# Patient Record
Sex: MALE | Race: WHITE | NOT HISPANIC OR LATINO | Employment: FULL TIME | URBAN - METROPOLITAN AREA
[De-identification: names, ages, dates, MRNs, and addresses within clinical notes are randomized per-mention and may not be internally consistent; named-entity substitution may affect disease eponyms.]

---

## 2023-08-03 ENCOUNTER — OFFICE VISIT (OUTPATIENT)
Dept: URGENT CARE | Facility: CLINIC | Age: 53
End: 2023-08-03
Payer: COMMERCIAL

## 2023-08-03 VITALS
TEMPERATURE: 98.7 F | SYSTOLIC BLOOD PRESSURE: 120 MMHG | RESPIRATION RATE: 18 BRPM | OXYGEN SATURATION: 99 % | WEIGHT: 200 LBS | BODY MASS INDEX: 24.36 KG/M2 | HEART RATE: 56 BPM | DIASTOLIC BLOOD PRESSURE: 70 MMHG | HEIGHT: 76 IN

## 2023-08-03 DIAGNOSIS — S80.811A ABRASION, LEG W/ INFECTION, RIGHT, INITIAL ENCOUNTER: ICD-10-CM

## 2023-08-03 DIAGNOSIS — L08.9 ABRASION, LEG W/ INFECTION, RIGHT, INITIAL ENCOUNTER: ICD-10-CM

## 2023-08-03 PROCEDURE — 3074F SYST BP LT 130 MM HG: CPT | Performed by: NURSE PRACTITIONER

## 2023-08-03 PROCEDURE — 3078F DIAST BP <80 MM HG: CPT | Performed by: NURSE PRACTITIONER

## 2023-08-03 PROCEDURE — 90715 TDAP VACCINE 7 YRS/> IM: CPT | Performed by: NURSE PRACTITIONER

## 2023-08-03 PROCEDURE — 99203 OFFICE O/P NEW LOW 30 MIN: CPT | Mod: 25 | Performed by: NURSE PRACTITIONER

## 2023-08-03 PROCEDURE — 90471 IMMUNIZATION ADMIN: CPT | Performed by: NURSE PRACTITIONER

## 2023-08-03 RX ORDER — CEPHALEXIN 500 MG/1
500 CAPSULE ORAL 4 TIMES DAILY
Qty: 28 CAPSULE | Refills: 0 | Status: SHIPPED | OUTPATIENT
Start: 2023-08-03 | End: 2023-08-10

## 2023-08-03 RX ORDER — SULFAMETHOXAZOLE AND TRIMETHOPRIM 800; 160 MG/1; MG/1
1 TABLET ORAL 2 TIMES DAILY
Qty: 14 TABLET | Refills: 0 | Status: SHIPPED | OUTPATIENT
Start: 2023-08-03 | End: 2023-08-10

## 2023-08-03 ASSESSMENT — ENCOUNTER SYMPTOMS
CHILLS: 0
FEVER: 0

## 2023-08-04 NOTE — PATIENT INSTRUCTIONS
-NSAID's (ibuprofen) and tylenol as directed for pain and inflammation.   -RICE Therapy: Rest, Ice, Compression, Elevation  -Gently clean area with mild soap and water.      Start with the cephalexin first. If there are increasing signs of infection, take the Bactrim in addition to the cephalexin.     Follow up for persistent or increased signs of infection (redness, red streaking, pus, foul odor, severe pain, increased swelling or fever) or any other concerns. Follow up emergently for severe uncontrolled pain, neurovascular compromise (decreased sensation, motion, or circulation).

## 2023-08-04 NOTE — PROGRESS NOTES
"  Subjective:     Elia Deal is a 52 y.o. male who presents for Wound Infection (X 7 days, scrape redness, swelling, ankle swelling.)      Slipped and cut his right leg on wood, outdoors a week ago. Redness developed a couple of days ago. Swelling now distally in to the ankle. No pain currently. Has been using Neosporin.           Wound Infection  This is a new problem. The current episode started in the past 7 days. The problem has been gradually worsening. Pertinent negatives include no chills or fever.       History reviewed. No pertinent past medical history.    History reviewed. No pertinent surgical history.    Social History     Socioeconomic History    Marital status:      Spouse name: Not on file    Number of children: Not on file    Years of education: Not on file    Highest education level: Not on file   Occupational History    Not on file   Tobacco Use    Smoking status: Never    Smokeless tobacco: Never   Vaping Use    Vaping Use: Never used   Substance and Sexual Activity    Alcohol use: Yes     Comment: occ    Drug use: Never    Sexual activity: Not on file   Other Topics Concern    Not on file   Social History Narrative    Not on file     Social Determinants of Health     Financial Resource Strain: Not on file   Food Insecurity: Not on file   Transportation Needs: Not on file   Physical Activity: Not on file   Stress: Not on file   Social Connections: Not on file   Intimate Partner Violence: Not on file   Housing Stability: Not on file        History reviewed. No pertinent family history.     Allergies   Allergen Reactions    Azithromycin        Review of Systems   Constitutional:  Negative for chills and fever.   Musculoskeletal:  Negative for joint pain.   All other systems reviewed and are negative.       Objective:   /70   Pulse (!) 56   Temp 37.1 °C (98.7 °F) (Temporal)   Resp 18   Ht 1.93 m (6' 4\")   Wt 90.7 kg (200 lb)   SpO2 99%   BMI 24.34 kg/m²     Physical Exam  Vitals " reviewed.   Pulmonary:      Effort: Pulmonary effort is normal. No respiratory distress.   Skin:     General: Skin is warm and dry.      Findings: Erythema and wound present.      Comments: 5 cm linear scabbed abrasion to lower right shin, 2 cm surrounding erythema. No palpable abscess formation. No drainage. Edema peripherally, to ankle.   Neurological:      Mental Status: He is alert and oriented to person, place, and time.         Assessment/Plan:   1. Abrasion, leg w/ infection, right, initial encounter  - Tdap =>6yo IM  - cephALEXin (KEFLEX) 500 MG Cap; Take 1 Capsule by mouth 4 times a day for 7 days.  Dispense: 28 Capsule; Refill: 0  -Contingent- sulfamethoxazole-trimethoprim (BACTRIM DS) 800-160 MG tablet; Take 1 Tablet by mouth 2 times a day for 7 days.  Dispense: 14 Tablet; Refill: 0    -NSAID's (ibuprofen) and tylenol as directed for pain and inflammation.   -RICE Therapy: Rest, Ice, Compression, Elevation  -Gently clean area with mild soap and water.      Start with the cephalexin first. If there are increasing signs of infection, take the Bactrim in addition to the cephalexin.     Follow up for persistent or increased signs of infection (redness, red streaking, pus, foul odor, severe pain, increased swelling or fever) or any other concerns. Follow up emergently for severe uncontrolled pain, neurovascular compromise (decreased sensation, motion, or circulation).    Differential diagnosis, natural history, supportive care, and indications for immediate follow-up discussed.